# Patient Record
Sex: FEMALE | Race: WHITE | URBAN - METROPOLITAN AREA
[De-identification: names, ages, dates, MRNs, and addresses within clinical notes are randomized per-mention and may not be internally consistent; named-entity substitution may affect disease eponyms.]

---

## 2024-05-29 ENCOUNTER — EMERGENCY (EMERGENCY)
Facility: HOSPITAL | Age: 69
LOS: 1 days | Discharge: HOME | End: 2024-05-29
Attending: EMERGENCY MEDICINE | Admitting: EMERGENCY MEDICINE
Payer: MEDICARE

## 2024-05-29 VITALS
SYSTOLIC BLOOD PRESSURE: 147 MMHG | HEART RATE: 63 BPM | TEMPERATURE: 98 F | OXYGEN SATURATION: 98 % | DIASTOLIC BLOOD PRESSURE: 80 MMHG | RESPIRATION RATE: 16 BRPM

## 2024-05-29 PROCEDURE — 73110 X-RAY EXAM OF WRIST: CPT | Mod: 26,RT

## 2024-05-29 PROCEDURE — 70450 CT HEAD/BRAIN W/O DYE: CPT | Mod: 26,MC

## 2024-05-29 PROCEDURE — 99284 EMERGENCY DEPT VISIT MOD MDM: CPT

## 2024-05-29 PROCEDURE — 70486 CT MAXILLOFACIAL W/O DYE: CPT | Mod: 26,MC

## 2024-05-29 PROCEDURE — 72125 CT NECK SPINE W/O DYE: CPT | Mod: 26,MC

## 2024-05-29 RX ORDER — ACETAMINOPHEN 500 MG
650 TABLET ORAL ONCE
Refills: 0 | Status: COMPLETED | OUTPATIENT
Start: 2024-05-29 | End: 2024-05-29

## 2024-05-29 RX ADMIN — Medication 650 MILLIGRAM(S): at 12:59

## 2024-05-29 NOTE — ED PROVIDER NOTE - PATIENT PORTAL LINK FT
You can access the FollowMyHealth Patient Portal offered by [facility] by registering at the following website: [email or web address]. By joining [facility]’s FollowMyHealth portal, you will also be able to view your health information using other applications (apps) compatible with our system.

## 2024-05-29 NOTE — ED PROVIDER NOTE - CLINICAL SUMMARY MEDICAL DECISION MAKING FREE TEXT BOX
Patient with history of A-fib, on Eliquis, presents with head strike after mechanical trip and fall on uneven sidewalk.  On exam patient has tenderness of mandible with abrasion to chin.  Patient also has mild bruising and tenderness of right wrist.  Plan for CT head, C-spine, facial bones, x-ray right wrist, analgesia, reassess.    No fracture on x-ray.  No acute intracranial hemorrhage.  No facial bone or C-spine fracture.  Patient feels well and wants to go home.  Tolerating p.o. in ED.  Stable for DC with plan for PMD follow-up.

## 2024-05-29 NOTE — ED ADULT NURSE NOTE - IS THE PATIENT ABLE TO BE SCREENED?
Prior Authorization Approval    Authorization Effective Date: 12/20/2021  Authorization Expiration Date: 12/20/2022  Medication: humira  Approved Dose/Quantity: 2/28ds  Reference #: [id number]   Insurance Company: [employer] - Phone [phone] Fax [phone]  Expected CoPay: na     CoPay Card Available: Yes    Foundation Assistance Needed: na  Which Pharmacy is filling the prescription (Not needed for infusion/clinic administered): [facility] - [place] [place] [address]       Yes

## 2024-05-29 NOTE — ED ADULT TRIAGE NOTE - CHIEF COMPLAINT QUOTE
BIBEMS from street, trip/fall on sidewalk today. +hs, -loc, Pt on eliquis 5mg BID for afib. Abrasions to lip and chin. Increased jaw pain since fall.

## 2024-05-29 NOTE — ED PROVIDER NOTE - OBJECTIVE STATEMENT
68-year-old female with history of A-fib on Eliquis presents with head injury.  Patient states that she tripped over uneven sidewalk, fell forward striking her chin on the ground.  No LOC.  Patient has mild pain in right wrist, otherwise no pain elsewhere in body.  Feels teeth are well aligned.  No double vision or unilateral weakness or numbness.  No neck pain.

## 2024-05-29 NOTE — ED PROVIDER NOTE - PHYSICAL EXAMINATION
Constitutional: awake and alert, in no acute distress  HEENT: head normocephalic. abrasion to chin, no laceration. TTP to mandible.  moist mucous membranes  Eyes: extraocular movements intact, normal conjunctiva  Neck: supple, normal ROM  Cardiovascular: regular rate   Pulmonary: no respiratory distress  Gastrointestinal: abdomen flat and nondistended  Skin: warm, dry, normal for ethnicity  Musculoskeletal: mild swelling and bruising of R wrist, full AROM/ PROM.  Neurological: oriented x4, no focal neurologic deficit.   Psychiatric: calm and cooperative
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
## 2024-05-31 DIAGNOSIS — I48.91 UNSPECIFIED ATRIAL FIBRILLATION: ICD-10-CM

## 2024-05-31 DIAGNOSIS — S09.90XA UNSPECIFIED INJURY OF HEAD, INITIAL ENCOUNTER: ICD-10-CM
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
## 2024-05-31 DIAGNOSIS — Z79.01 LONG TERM (CURRENT) USE OF ANTICOAGULANTS: ICD-10-CM

## 2024-05-31 DIAGNOSIS — S09.93XA UNSPECIFIED INJURY OF FACE, INITIAL ENCOUNTER: ICD-10-CM

## 2024-11-05 NOTE — ED ADULT NURSE NOTE - NS ED NOTE ABUSE SUSPICION NEGLECT YN
Nutrition Rx & Recommendations:  Diet: enteral nutrition as 1' source of nutrition  Stay hydrated by sipping fluids of choice/tolerance throughout the day.    Follow proper oral care; Try baking soda/salt water rinse recipe (mix 3/4 tsp salt + 1 tsp baking soda + 1 qt water; rinse with plain water after using) in Eating Hints book (pg 18).  Brush your teeth before/after meals & before bed.  Weigh yourself regularly. If you notice weight loss, make an effort to increase your daily food/calorie intake. If you continue to notice loss after these efforts, reach out to your dietitian to establish a plan to stabilize weight.   Pureed foods as able  Pureed Foods:  To puree foods: placed chopped food into  or . Add enough liquid to cover the blade. Blend until food is smooth and free of chunks.  You may add non-fat milk, clear broths or non-fat gravies when blending foods.  You can use baby food as long as you have a pureed protein source as the main part of your meal.  Avoid items with added sugars (read food labels).  Ideas for pureed foods: yogurt, cottage cheese, jello, pureed soups, applesauce, pureed fruit, baby food vegetables/pureed vegetables, mashed potatoes, oatmeal or cream of wheat (make with milk for more calories/protein)  TF plan - continue to aim for 6 cartons/day  TF Goal: 2 containers (474 mL) 3 times daily of Jevity 1.5  Water Flushin mL free water flush pre/post each bolus (360 mL water) + additional 150 mL water flushes 4 times daily (600 mL water).   TF provides: 1422 mL total volume (6 cartons), 2133 kcal, 91g protein, 1081 mL free water, 2041 mL total fluid  Allow for substitutions  Your syringes are each 60 mL or 2 fl oz.  Always flush your feeding tube with 60 mL room-temp water 1-2 times daily while feeding tube is not in use.  [facility] have 10 days [facility] of TF supplies: [phone], option [phone] (customer support).      Follow Up Plan: 24 after  RT  Recommend Referral to Other Providers: none at this time   No